# Patient Record
Sex: FEMALE | Race: BLACK OR AFRICAN AMERICAN
[De-identification: names, ages, dates, MRNs, and addresses within clinical notes are randomized per-mention and may not be internally consistent; named-entity substitution may affect disease eponyms.]

---

## 2018-02-04 ENCOUNTER — HOSPITAL ENCOUNTER (EMERGENCY)
Dept: HOSPITAL 61 - ER | Age: 58
Discharge: HOME | End: 2018-02-04
Payer: MEDICARE

## 2018-02-04 VITALS — DIASTOLIC BLOOD PRESSURE: 95 MMHG | SYSTOLIC BLOOD PRESSURE: 174 MMHG

## 2018-02-04 DIAGNOSIS — R42: ICD-10-CM
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
## 2018-02-04 DIAGNOSIS — S42.293A: Primary | ICD-10-CM
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
## 2018-02-04 LAB
ADD MAN DIFF?: NO
ALBUMIN SERPL-MCNC: 3.5 G/DL (ref 3.4–5)
ALBUMIN/GLOB SERPL: 1 {RATIO} (ref 1–1.7)
ALP SERPL-CCNC: 110 U/L (ref 46–116)
ALT (SGPT): 27 U/L (ref 14–59)
ANION GAP SERPL CALC-SCNC: 8 MMOL/L (ref 6–14)
AST SERPL-CCNC: 23 U/L (ref 15–37)
BACTERIA,URINE: (no result) /HPF
BASO #: 0.1 X10^3/UL (ref 0–0.2)
BASO %: 1 % (ref 0–3)
BILIRUBIN,URINE: NEGATIVE
BLOOD UREA NITROGEN: 14 MG/DL (ref 7–20)
BUN/CREAT SERPL: 14 (ref 6–20)
CALCIUM: 8.6 MG/DL (ref 8.5–10.1)
CHLORIDE: 104 MMOL/L (ref 98–107)
CLARITY,URINE: CLEAR
CO2 SERPL-SCNC: 29 MMOL/L (ref 21–32)
COLOR,URINE: YELLOW
CREAT SERPL-MCNC: 1 MG/DL (ref 0.6–1)
EOS #: 0.1 X10^3/UL (ref 0–0.7)
EOS %: 2 % (ref 0–3)
GFR SERPLBLD BASED ON 1.73 SQ M-ARVRAT: 68.9 ML/MIN
GLOBULIN SER-MCNC: 3.6 G/DL (ref 2.2–3.8)
GLUCOSE SERPL-MCNC: 104 MG/DL (ref 70–99)
GLUCOSE,URINE: NEGATIVE MG/DL
HCG SERPL-ACNC: 7.6 X10^3/UL (ref 4–11)
HEMATOCRIT: 31.8 % (ref 36–47)
HEMOGLOBIN: 10.6 G/DL (ref 12–15.5)
LYMPH #: 3.3 X10^3/UL (ref 1–4.8)
LYMPH %: 44 % (ref 24–48)
MEAN CORPUSCULAR HEMOGLOBIN: 29 PG (ref 25–35)
MEAN CORPUSCULAR HGB CONC: 34 G/DL (ref 31–37)
MEAN CORPUSCULAR VOLUME: 87 FL (ref 79–100)
MONO #: 0.6 X10^3/UL (ref 0–1.1)
MONO %: 8 % (ref 0–9)
NEUT #: 3.4 X10^3UL (ref 1.8–7.7)
NEUT %: 45 % (ref 31–73)
NITRITE,URINE: POSITIVE
PH,URINE: 6.5
PLATELET COUNT: 291 X10^3/UL (ref 140–400)
POTASSIUM SERPL-SCNC: 3.5 MMOL/L (ref 3.5–5.1)
PROTEIN,URINE: NEGATIVE MG/DL
RBC,URINE: (no result) /HPF (ref 0–2)
RED BLOOD COUNT: 3.64 X10^6/UL (ref 3.5–5.4)
RED CELL DISTRIBUTION WIDTH: 13.9 % (ref 11.5–14.5)
SODIUM: 141 MMOL/L (ref 136–145)
SPECIFIC GRAVITY,URINE: 1.02
SQUAMOUS EPITHELIAL CELL,UR: (no result) /LPF
TOTAL BILIRUBIN: 0.2 MG/DL (ref 0.2–1)
TOTAL PROTEIN: 7.1 G/DL (ref 6.4–8.2)
TROPONINI: < 0.017 NG/ML (ref 0–0.06)
UROBILINOGEN,URINE: 0.2 MG/DL
WBC,URINE: (no result) /HPF (ref 0–4)

## 2018-02-04 PROCEDURE — 85025 COMPLETE CBC W/AUTO DIFF WBC: CPT

## 2018-02-04 PROCEDURE — 87086 URINE CULTURE/COLONY COUNT: CPT

## 2018-02-04 PROCEDURE — 36415 COLL VENOUS BLD VENIPUNCTURE: CPT

## 2018-02-04 PROCEDURE — 73060 X-RAY EXAM OF HUMERUS: CPT

## 2018-02-04 PROCEDURE — 93005 ELECTROCARDIOGRAM TRACING: CPT

## 2018-02-04 PROCEDURE — 80053 COMPREHEN METABOLIC PANEL: CPT

## 2018-02-04 PROCEDURE — 87186 SC STD MICRODIL/AGAR DIL: CPT

## 2018-02-04 PROCEDURE — 84484 ASSAY OF TROPONIN QUANT: CPT

## 2018-02-04 PROCEDURE — 73030 X-RAY EXAM OF SHOULDER: CPT

## 2018-02-04 PROCEDURE — 81001 URINALYSIS AUTO W/SCOPE: CPT

## 2018-02-04 PROCEDURE — 96372 THER/PROPH/DIAG INJ SC/IM: CPT

## 2018-02-04 PROCEDURE — 99285 EMERGENCY DEPT VISIT HI MDM: CPT

## 2018-02-04 RX ADMIN — FENTANYL CITRATE 1 MCG: 50 INJECTION INTRAMUSCULAR; INTRAVENOUS at 18:30

## 2018-02-04 RX ADMIN — ONDANSETRON 1 MG: 4 TABLET, ORALLY DISINTEGRATING ORAL at 18:30

## 2021-09-13 ENCOUNTER — HOSPITAL ENCOUNTER (OUTPATIENT)
Dept: HOSPITAL 61 - MAMMO | Age: 61
End: 2021-09-13
Attending: FAMILY MEDICINE
Payer: COMMERCIAL

## 2021-09-13 DIAGNOSIS — Z12.31: Primary | ICD-10-CM

## 2021-09-13 PROCEDURE — 77063 BREAST TOMOSYNTHESIS BI: CPT

## 2021-09-13 PROCEDURE — 77067 SCR MAMMO BI INCL CAD: CPT

## 2021-09-13 NOTE — RAD
EXAM: Bilateral digital screening mammogram with tomosynthesis.



HISTORY: 61-year-old female presents for screening mammography.



TECHNIQUE: Full-field digital craniocaudal and mediolateral oblique 2D and 3D tomosynthesis images of
 both breasts are obtained for evaluation. Computer aided detection was applied.



COMPARISON: 4/10/2015



BREAST PARENCHYMAL DENSITY: Level B - Scattered fibroglandular densities.



FINDINGS: There is a small nodular density within the 8:30 position of the left breast at mid depth w
hich may conspicuous compared to the prior study due to differences in technique. There are areas of 
asymmetry which are stable in appearance. There is no suspicious calcification or architectural disto
rtion.



IMPRESSION: BI-RADS Category 0: Incomplete Additional imaging needed.



RECOMMENDATION: Further evaluation with a left breast sonogram is recommended to assess nodularity at
 the 8:30 position at mid depth.



If your mammogram demonstrates that you have dense breast tissue, which could hide abnormalities, and
 if you have other risk factors for breast cancer that have been identified, you might benefit from s
upplemental screening tests that may be suggested by your ordering physician.  Dense breast tissue, i
n and of itself, is a relatively common condition.  This information is not provided to cause undue c
oncern, but rather to raise your awareness and to promote discussion with your physician regarding th
e presence of other risk factors, in addition to dense breast tissue. A report of your mammography re
sults will be sent to you and your physician.  You should contact your physician if you have any ques
tions or concerns regarding this report.  



Mammography is a sensitive method for finding small breast cancers, but it does not detect them all a
nd is not a substitute for careful clinical examination.  A negative mammogram does not negate a clin
ically suspicious finding and should not result in delay in biopsying a clinically suspicious abnorma
lity.



PQRS compliance statement -  Patient information was entered into a reminder system with a target due
 date for the next mammogram. 



"Our facility is accredited by the [employer] Mammography Program."



Electronically signed by: Ayana Gutierrez MD (9/13/2021 3:16 PM) [id number]

## 2021-09-20 ENCOUNTER — HOSPITAL ENCOUNTER (OUTPATIENT)
Dept: HOSPITAL 61 - SURG | Age: 61
Discharge: HOME | End: 2021-09-20
Attending: SURGERY
Payer: COMMERCIAL

## 2021-09-20 VITALS
SYSTOLIC BLOOD PRESSURE: 101 MMHG | SYSTOLIC BLOOD PRESSURE: 101 MMHG | DIASTOLIC BLOOD PRESSURE: 65 MMHG | DIASTOLIC BLOOD PRESSURE: 65 MMHG

## 2021-09-20 VITALS — WEIGHT: 152.12 LBS | BODY MASS INDEX: 25.97 KG/M2 | HEIGHT: 64 IN

## 2021-09-20 VITALS — SYSTOLIC BLOOD PRESSURE: 123 MMHG | DIASTOLIC BLOOD PRESSURE: 72 MMHG

## 2021-09-20 DIAGNOSIS — F41.9: ICD-10-CM

## 2021-09-20 DIAGNOSIS — Z90.710: ICD-10-CM

## 2021-09-20 DIAGNOSIS — K64.4: Primary | ICD-10-CM

## 2021-09-20 DIAGNOSIS — Z98.890: ICD-10-CM

## 2021-09-20 DIAGNOSIS — I10: ICD-10-CM

## 2021-09-20 DIAGNOSIS — M19.90: ICD-10-CM

## 2021-09-20 DIAGNOSIS — F17.210: ICD-10-CM

## 2021-09-20 DIAGNOSIS — F32.9: ICD-10-CM

## 2021-09-20 DIAGNOSIS — Z90.12: ICD-10-CM

## 2021-09-20 PROCEDURE — A4930 STERILE, GLOVES PER PAIR: HCPCS

## 2021-09-20 PROCEDURE — 46250 REMOVE EXT HEM GROUPS 2+: CPT

## 2021-09-20 PROCEDURE — 88304 TISSUE EXAM BY PATHOLOGIST: CPT

## 2021-09-20 PROCEDURE — A6253 ABSORPT DRG > 48 SQ IN W/O B: HCPCS

## 2021-09-20 NOTE — PDOC4
Operative Note


Operative Note


Operative Note:





Preoperative Diagnosis: Hemorrhoids





Postoperative Diagnosis: Same





Procedure: Hemorrhoidectomy





Surgeon: Javier





Assistant: Hadley ABARCA





Anesthesia: General





EBL: 10 mL





Specimen: Hemorrhoids to pathology





Drains: None





Complications: None





Indication: The patient is a 61-year-old female who is referred due to 

symptomatic hemorrhoids. Examination shows prominent primarily external 

hemorrhoids. She requests excision due to ongoing problems. The risks of surgery

were discussed which include bleeding, infection, recurrence, pain, incontinence

, anal stenosis, potential need for additional surgery procedure. She 

understands and would like to proceed





Description: The patient was taken to the operating room and placed supine on 

the operating table. General anesthesia was performed. She was then placed in 

prone jackknife. The perianal skin was prepped with Betadine and draped in a 

standard surgical manner. Examination identified prominent external hemorrhoids 

with a minimal internal component. No other anal lesions were identified. Using 

the LigaSure device each of the external hemorrhoids was excised at its base. A 

small amount of redundant anoderm was excised as well and each specimen was sent

to pathology labeled as hemorrhoids. Following excision hemostasis was good. A 

Gelfoam pack was placed in the anal canal to assist with future hemostasis. A 

sterile dressing was then applied. The patient tolerated the procedure well and 

was sent to the recovery room in stable condition. At the end of the case all 

counts were correct











ANISH TAMEZ MD             Sep 20, 2021 08:19

## 2021-09-20 NOTE — DISCH
DISCHARGE INSTRUCTIONS


Condition on Discharge


Condition on Discharge:  Stable





Activity After Discharge


Activity Instructions for Disc:  Activity as tolerated, Other, see below


Driving Instructions after Dis:  Other, see below (no driving while taking pain 

meds)





Wound Incision Care


Wound/Incision Care:  Other, see below (sitz baths BID)





Follow-Up


Follow up with:  Dr Tamez in office in 2 weeks, call for appt [phone]











ANISH TAMEZ MD             Sep 20, 2021 08:23

## 2021-09-29 ENCOUNTER — HOSPITAL ENCOUNTER (OUTPATIENT)
Dept: HOSPITAL 61 - US | Age: 61
End: 2021-09-29
Attending: FAMILY MEDICINE
Payer: COMMERCIAL

## 2021-09-29 DIAGNOSIS — R92.8: Primary | ICD-10-CM

## 2021-09-29 PROCEDURE — 76641 ULTRASOUND BREAST COMPLETE: CPT

## 2021-09-29 NOTE — RAD
EXAM:  US BREAST LT 9/29/2021 1:53 PM



CLINICAL INDICATION:  Callback from left breast mammogram 9/13/2021 4 nodular focal asymmetry at 8:30
 middle depth.



COMPARISON:  Screening mammogram 9/13/2021



TECHNIQUE:  Grayscale and color Doppler ultrasound of the left breast was performed in the area of co
ncern



FINDINGS: Questionable small lymph node in the area of concern. There is no cyst or mass identified. 
No axillary lymphadenopathy.



IMPRESSION:  Possible small lymph node in the area of concern. Recommend 6 month follow-up mammogram 
and ultrasound to ensure stability.





BI-RADS category 3:  Probably benign.



Findings and recommendations were discussed by Dr. Mihcael with the patient at the time of exam.



Electronically signed by: Betsey Michael MD (9/29/2021 3:08 PM) [id number]

## 2021-11-10 ENCOUNTER — HOSPITAL ENCOUNTER (OUTPATIENT)
Dept: HOSPITAL 61 - ENDOS | Age: 61
Discharge: HOME | End: 2021-11-10
Attending: INTERNAL MEDICINE
Payer: COMMERCIAL

## 2021-11-10 VITALS — BODY MASS INDEX: 25.48 KG/M2 | HEIGHT: 64 IN | WEIGHT: 149.25 LBS

## 2021-11-10 VITALS — SYSTOLIC BLOOD PRESSURE: 132 MMHG | DIASTOLIC BLOOD PRESSURE: 80 MMHG

## 2021-11-10 VITALS — DIASTOLIC BLOOD PRESSURE: 71 MMHG | SYSTOLIC BLOOD PRESSURE: 100 MMHG

## 2021-11-10 DIAGNOSIS — Z12.11: Primary | ICD-10-CM

## 2021-11-10 DIAGNOSIS — Z98.890: ICD-10-CM

## 2021-11-10 DIAGNOSIS — K64.0: ICD-10-CM

## 2021-11-10 DIAGNOSIS — F32.9: ICD-10-CM

## 2021-11-10 DIAGNOSIS — Z83.3: ICD-10-CM

## 2021-11-10 DIAGNOSIS — Z85.3: ICD-10-CM

## 2021-11-10 DIAGNOSIS — Z90.710: ICD-10-CM

## 2021-11-10 DIAGNOSIS — Z82.49: ICD-10-CM

## 2021-11-10 DIAGNOSIS — Z80.3: ICD-10-CM

## 2021-11-10 DIAGNOSIS — K63.5: ICD-10-CM

## 2021-11-10 DIAGNOSIS — F41.9: ICD-10-CM

## 2021-11-10 DIAGNOSIS — F17.210: ICD-10-CM

## 2021-11-10 DIAGNOSIS — K63.89: ICD-10-CM

## 2021-11-10 DIAGNOSIS — M19.90: ICD-10-CM

## 2021-11-10 DIAGNOSIS — Z72.89: ICD-10-CM

## 2021-11-10 DIAGNOSIS — I10: ICD-10-CM

## 2021-11-10 DIAGNOSIS — Z86.010: ICD-10-CM

## 2021-11-10 DIAGNOSIS — Z79.899: ICD-10-CM

## 2021-11-10 DIAGNOSIS — Z80.0: ICD-10-CM

## 2021-11-10 PROCEDURE — 45380 COLONOSCOPY AND BIOPSY: CPT

## 2021-11-10 PROCEDURE — 88305 TISSUE EXAM BY PATHOLOGIST: CPT

## 2021-11-10 NOTE — HP
DATE OF SERVICE: 11/10/2021

ADMIT DATE: 11/10/2021

UPDATED HISTORY AND PHYSICAL



REFERRING PHYSICIAN:  Domonique Robles DO



REASON:  History of colonic polyps.



HISTORY OF PRESENT ILLNESS:  A 61-year-old -American female whose past 

medical history is significant for colonic polyps, depression, fibromyalgia, 

anemia as well as hypertension.  She is seen for interval exam.  Bowel habits 

are regular without diarrhea.  There has been no melena or hematochezia.  Weight

and appetite are stable.  There has been no melena or additional complications 

at this time.



PAST MEDICAL HISTORY:  Significant for colonic polyps, depression, fibromyalgia,

anemia, arthritis, hypertension.



ALLERGIES:  None.



MEDICATIONS:  Include atenolol, Celebrex, Flonase, omega 3, oxycodone, Seroquel 

and venlafaxine.



FAMILY HISTORY:  Significant for colon cancer in an uncle and diabetes in 

sibling.  Breast cancer in mother.  Colon polyps in grandmother.  Hypertension 

in multiple family members.



PAST SURGICAL HISTORY:  Hysterectomy, tonsillectomy, breast surgery.



REVIEW OF SYSTEMS:  Per records.



PHYSICAL EXAMINATION:

GENERAL:  Reveals a well-nourished, well-developed -American female.

VITAL SIGNS:  Temp is 97.7, pulse 80, respiratory rate 18.

LUNGS:  Clear.

CARDIOVASCULAR:  Reveals an S1, S2 without S3, S4 or appreciable murmur.

ABDOMEN:  Reveals a soft abdomen, normal bowel sounds without appreciable 

hepatosplenomegaly.

EXTREMITIES:  Reveals no cyanosis, clubbing or edema.



IMPRESSION AND PLAN:  History of colonic polyps.  Surveillance exam is 

recommended at that time.  Risks, benefits of procedure including risk of 

hemorrhage and perforation with operation were discussed.  The patient is 

willing to proceed at this time.







OTTO

DR: SSP/nts   DD: 11/10/2021 07:57

DT: 11/10/2021 08:15   TID: [id number]

## 2021-11-11 NOTE — PATHOLOGY
[facility] Accession Number: [id number]

.                                                                01

Material submitted:                                        .

sigmoid colon - SIGMOID POLYP BIOPSY

.                                                                01

Clinical history:                                          .

HX POLYPS

COLONOSCOPY

.                                                                02

**********************************************************************

Diagnosis:

Colon biopsies, sigmoid colon polyps:

- Hyperplastic polyps.

.

([facility]:mml; 11/11/2021)

M  11/11/2021  1600 Local

**********************************************************************

.                                                                02

Comment:

There are no adenomatous changes or evidence of malignancy.

.

([facility]:mml; 11/11/2021)

.                                                                02

Electronically signed:                                     .

Rajeev Stark MD, Pathologist

NPI- [id number]

.                                                                01

Gross description:                                         .

The specimen is submitted in formalin, labeled "Celaya, Erika, sigmoid

polyp BX". Received are 3 segments of pale tan tissue ranging in size from

0.3 to 0.5 cm in maximum dimensions. The specimen is submitted entirely in

cassette A1.

([facility]; 11/10/2021)

[facility]  11/10/2021  1923 Local

.                                                                02

Pathologist provided ICD-10:

K63.5

.                                                                02

CPT                                                        .

582230

Specimen Comment: A courtesy copy of this report has been sent to [phone], [phone]

Specimen Comment: [id number]

Specimen Comment: Report sent to  / DR OMALLEY

***Performed at:  01

   [facility]

   [address], [place]  [id number]

   MD Nabor Guerra MD Phone:  [phone]

***Performed at:  02

   [facility]

   [address] [place], [place]  [id number]

   MD Rajeev Stark MD Phone:  [phone]

## 2024-01-13 NOTE — PATHOLOGY
[facility] Accession Number: [id number]

.                                                                01

Material submitted:                                        .

hemorrhoids - HEMORRHOIDS

.                                                                01

Clinical history:                                          .

HEMORRHOIDECTOMY

.                                                                02

**********************************************************************

Diagnosis:

Segments of skin and anorectal mucosa and underlying soft tissue,

hemorrhoidectomy:

- Hemorrhoids.

.

([facility]:mml; 09/21/2021)

[facility]  09/21/2021  1442 Local

**********************************************************************

.                                                                02

Comment:

There is no evidence of malignancy.

.                                                                02

Electronically signed:                                     .

Rajeev Stark MD, Pathologist

NPI- [id number]

.                                                                01

Gross description:                                         .

Fixative: Formalin

Labeled: Hemorrhoids

Specimen received: 3, irregular skin and soft tissue fragments.  The

epidermal surface appears tan-brown, focally hemorrhagic, focally wrinkled

and hairbearing

Dimensions: Range from 1.0-3.4 cm

Cut surface: Tan and rubbery with focally dilated vessels

Representative tissue in cassette A1.

([facility]; 9/20/2021)

[facility]  09/20/2021  1538 Local

.                                                                02

Pathologist provided ICD-10:

K64.9

.                                                                02

CPT                                                        .

[id number]

Specimen Comment: A courtesy copy of this report has been sent to [phone], [phone]

Specimen Comment: [id number]

Specimen Comment: Report sent to  / DR OMALLEY

***Performed at:  01

   [facility]

   [address], [place]  [id number]

   MD Nabor Guerra MD Phone:  [phone]

***Performed at:  02

   [facility]

   [address] [place], [place]  [id number]

   MD Rajeev Stark MD Phone:  [phone] normal (ped)...